# Patient Record
Sex: MALE | Race: WHITE | NOT HISPANIC OR LATINO | URBAN - METROPOLITAN AREA
[De-identification: names, ages, dates, MRNs, and addresses within clinical notes are randomized per-mention and may not be internally consistent; named-entity substitution may affect disease eponyms.]

---

## 2022-10-20 ENCOUNTER — EMERGENCY (EMERGENCY)
Facility: HOSPITAL | Age: 22
LOS: 0 days | Discharge: HOME | End: 2022-10-21
Attending: EMERGENCY MEDICINE | Admitting: EMERGENCY MEDICINE

## 2022-10-20 VITALS
HEART RATE: 87 BPM | RESPIRATION RATE: 18 BRPM | TEMPERATURE: 98 F | SYSTOLIC BLOOD PRESSURE: 154 MMHG | OXYGEN SATURATION: 99 % | DIASTOLIC BLOOD PRESSURE: 77 MMHG

## 2022-10-20 DIAGNOSIS — T15.01XA FOREIGN BODY IN CORNEA, RIGHT EYE, INITIAL ENCOUNTER: ICD-10-CM
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
## 2022-10-20 DIAGNOSIS — Z23 ENCOUNTER FOR IMMUNIZATION: ICD-10-CM
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
## 2022-10-20 PROCEDURE — 99283 EMERGENCY DEPT VISIT LOW MDM: CPT

## 2022-10-21 RX ORDER — TETANUS TOXOID, REDUCED DIPHTHERIA TOXOID AND ACELLULAR PERTUSSIS VACCINE, ADSORBED 5; 2.5; 8; 8; 2.5 [IU]/.5ML; [IU]/.5ML; UG/.5ML; UG/.5ML; UG/.5ML
0.5 SUSPENSION INTRAMUSCULAR ONCE
Refills: 0 | Status: COMPLETED | OUTPATIENT
Start: 2022-10-21 | End: 2022-10-21

## 2022-10-21 RX ORDER — POLYMYXIN B SULF/TRIMETHOPRIM 10000-1/ML
1 DROPS OPHTHALMIC (EYE)
Qty: 10 | Refills: 0
Start: 2022-10-21 | End: 2022-10-25

## 2022-10-21 RX ADMIN — TETANUS TOXOID, REDUCED DIPHTHERIA TOXOID AND ACELLULAR PERTUSSIS VACCINE, ADSORBED 0.5 MILLILITER(S): 5; 2.5; 8; 8; 2.5 SUSPENSION INTRAMUSCULAR at 01:48

## 2022-10-21 NOTE — ED PROVIDER NOTE - NSFOLLOWUPCLINICS_GEN_ALL_ED_FT
[facility]  Ophthalmolgy  [address], [address]  [place] [address]  Phone: [phone]  Fax:   Follow Up Time: Urgent

## 2022-10-21 NOTE — ED PROVIDER NOTE - PHYSICAL EXAMINATION
Afebrile, hemodynamically stable, saturating well  NAD, well appearing, sitting comfortably in chair, no WOB, speaking full sentences  Head NCAT  PERRL, EOMI, anicteric, no conjunctival injection, noted metallic foreign bodies to right lower nasal quadrant  MMM  Breathing comfortably on room air  AAO, CN's 3-12 grossly intact  WHITE spontaneously  Skin warm, well perfused

## 2022-10-21 NOTE — ED PROVIDER NOTE - CLINICAL SUMMARY MEDICAL DECISION MAKING FREE TEXT BOX
Appearance of corneal foreign body. Unable to remove despite scraping and using Q-tips, under tetracaine anesthesia. Patient is well appearing, NAD, afebrile, hemodynamically stable. Discharged with polytrim, instructions in further symptomatic care, return precautions, and need for eye clinic f/u in the AM.

## 2022-10-21 NOTE — ED PROVIDER NOTE - OBJECTIVE STATEMENT
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 He was wearing eye protection at the time. No Tdap within 10 years. Denies vision changes and all other symptoms.

## 2022-10-21 NOTE — ED PROVIDER NOTE - PATIENT PORTAL LINK FT
You can access the FollowMyHealth Patient Portal offered by [facility] by registering at the following website: [email or web address]. By joining [facility]’s FollowMyHealth portal, you will also be able to view your health information using other applications (apps) compatible with our system.

## 2022-10-21 NOTE — ED PROVIDER NOTE - NSFOLLOWUPINSTRUCTIONS_ED_ALL_ED_FT
Please follow up with the eye clinic today.  Please use the antibiotic drop as prescribed.  Please return to the emergency department if you have swelling, redness, vomiting, fever, or any other symptoms.      Eye Foreign Body    A foreign body is an object on or in the eye that should not be there. This could be any object, such as:  •A speck of dirt or dust.  •A hair or eyelash.  •A splinter.  •A piece of metal, such as when a tiny piece of metal is thrown into the air while a person is working with certain tools.    If the object is on the outside of the eyeball, it can usually be washed out or taken out by your doctor. An object that is inside the eyeball needs emergency treatment right away.    What are the causes?    This condition is caused by an object hitting or entering the eye.    What are the signs or symptoms?    Common symptoms of this condition include:  •Pain and irritation.  •Feeling like something is in the eye.  •Tears coming from the eye.  •Redness.  •Small "rust rings" around the object if it is metal.  •Blurry vision.    If the object is inside the eyeball, it may cause:  •A lot of pain.   •Loss of vision right away or blurry vision.  •A change in the shape of the black part of the eye (distortion of the pupil).    How is this treated?    Treatment for an object on the outside of the eyeball may include:  •Having the object removed from your eye by your doctor. Your doctor may do this by washing the eye or using small instruments. If you have rust in your eye from a metal object, the doctor will also remove the rust.  •Using eye drops that numb the eye to help with pain.  •Using antibiotic drops or ointment if the object scratched your cornea. The cornea is the clear covering on the front of the eye.  •Wearing a bandage (eye shield) over your eye.    If you have a foreign body inside your eyeball, you will need surgery right away.    You may need to see an eye specialist (ophthalmologist) for further treatment.    Follow these instructions at home:     Medicines   •Take over-the-counter and prescription medicines only as told by your doctor. Use eye drops or ointment as told.  •If you were prescribed antibiotic drops or ointment, use it as told by your doctor. Do not stop using it even if you start to feel better.    General instructions   •If you have a bandage on your eye:  •Wear it as told. Follow instructions from your doctor about when to take it off.  •Do not drive or use machinery while wearing the bandage.  •If you do not have a bandage on your eye:  •Keep your eye closed as much as possible.  •Do not rub your eye.  •Do not wear contact lenses until your eye feels normal, or as told by your doctor.  •Wear dark glasses in bright light.  •If you are doing activities with a high risk of eye injury, wear protective eye covering. These activities include using high-speed tools.  •Keep all follow-up visits.    Contact a doctor if:  •You have more pain in your eye.  •You have problems with your eye bandage.  •You have abnormal fluid (discharge) coming from your eye.    Get help right away if:  •Your ability to see (vision) gets worse.  •You have more redness and swelling in or around your eye.    Summary  •A foreign body is an object on or in the eye that should not be there.  •An object on the outside of the eyeball can usually be washed out or taken out by your doctor. An object inside the eyeball is an emergency.  •If you have a bandage on your eye (eye shield), do not drive while wearing it.  •If you have more pain in your eye, contact your doctor.    This information is not intended to replace advice given to you by your health care provider. Make sure you discuss any questions you have with your health care provider.